# Patient Record
Sex: MALE | Race: WHITE | NOT HISPANIC OR LATINO | Employment: OTHER | URBAN - METROPOLITAN AREA
[De-identification: names, ages, dates, MRNs, and addresses within clinical notes are randomized per-mention and may not be internally consistent; named-entity substitution may affect disease eponyms.]

---

## 2021-01-16 ENCOUNTER — APPOINTMENT (OUTPATIENT)
Dept: CT IMAGING | Facility: HOSPITAL | Age: 36
End: 2021-01-16

## 2021-01-16 ENCOUNTER — HOSPITAL ENCOUNTER (EMERGENCY)
Facility: HOSPITAL | Age: 36
Discharge: HOME | End: 2021-01-16
Attending: EMERGENCY MEDICINE | Admitting: EMERGENCY MEDICINE

## 2021-01-16 VITALS
HEIGHT: 73 IN | WEIGHT: 201 LBS | TEMPERATURE: 97.2 F | SYSTOLIC BLOOD PRESSURE: 123 MMHG | OXYGEN SATURATION: 100 % | HEART RATE: 82 BPM | DIASTOLIC BLOOD PRESSURE: 84 MMHG | RESPIRATION RATE: 17 BRPM | BODY MASS INDEX: 26.64 KG/M2

## 2021-01-16 DIAGNOSIS — K40.90 LEFT INGUINAL HERNIA: Primary | ICD-10-CM

## 2021-01-16 LAB
ALBUMIN SERPL-MCNC: 4.9 G/DL (ref 3.5–5.2)
ALBUMIN/GLOB SERPL: 2.1 G/DL
ALP SERPL-CCNC: 57 U/L (ref 39–117)
ALT SERPL W P-5'-P-CCNC: 22 U/L (ref 1–41)
ANION GAP SERPL CALCULATED.3IONS-SCNC: 11.1 MMOL/L (ref 5–15)
AST SERPL-CCNC: 21 U/L (ref 1–40)
BASOPHILS # BLD AUTO: 0.03 10*3/MM3 (ref 0–0.2)
BASOPHILS NFR BLD AUTO: 0.4 % (ref 0–1.5)
BILIRUB SERPL-MCNC: 0.7 MG/DL (ref 0–1.2)
BILIRUB UR QL STRIP: NEGATIVE
BUN SERPL-MCNC: 10 MG/DL (ref 6–20)
BUN/CREAT SERPL: 9.5 (ref 7–25)
CALCIUM SPEC-SCNC: 9.1 MG/DL (ref 8.6–10.5)
CHLORIDE SERPL-SCNC: 101 MMOL/L (ref 98–107)
CLARITY UR: CLEAR
CO2 SERPL-SCNC: 25.9 MMOL/L (ref 22–29)
COLOR UR: YELLOW
CREAT SERPL-MCNC: 1.05 MG/DL (ref 0.76–1.27)
DEPRECATED RDW RBC AUTO: 44 FL (ref 37–54)
EOSINOPHIL # BLD AUTO: 0.03 10*3/MM3 (ref 0–0.4)
EOSINOPHIL NFR BLD AUTO: 0.4 % (ref 0.3–6.2)
ERYTHROCYTE [DISTWIDTH] IN BLOOD BY AUTOMATED COUNT: 14.2 % (ref 12.3–15.4)
GFR SERPL CREATININE-BSD FRML MDRD: 79 ML/MIN/1.73
GFR SERPL CREATININE-BSD FRML MDRD: 96 ML/MIN/1.73
GLOBULIN UR ELPH-MCNC: 2.3 GM/DL
GLUCOSE SERPL-MCNC: 103 MG/DL (ref 65–99)
GLUCOSE UR STRIP-MCNC: NEGATIVE MG/DL
HCT VFR BLD AUTO: 55.4 % (ref 37.5–51)
HGB BLD-MCNC: 18.4 G/DL (ref 13–17.7)
HGB UR QL STRIP.AUTO: NEGATIVE
IMM GRANULOCYTES # BLD AUTO: 0.03 10*3/MM3 (ref 0–0.05)
IMM GRANULOCYTES NFR BLD AUTO: 0.4 % (ref 0–0.5)
KETONES UR QL STRIP: NEGATIVE
LEUKOCYTE ESTERASE UR QL STRIP.AUTO: NEGATIVE
LIPASE SERPL-CCNC: 16 U/L (ref 13–60)
LYMPHOCYTES # BLD AUTO: 1.26 10*3/MM3 (ref 0.7–3.1)
LYMPHOCYTES NFR BLD AUTO: 17.9 % (ref 19.6–45.3)
MCH RBC QN AUTO: 29 PG (ref 26.6–33)
MCHC RBC AUTO-ENTMCNC: 33.2 G/DL (ref 31.5–35.7)
MCV RBC AUTO: 87.4 FL (ref 79–97)
MONOCYTES # BLD AUTO: 0.45 10*3/MM3 (ref 0.1–0.9)
MONOCYTES NFR BLD AUTO: 6.4 % (ref 5–12)
NEUTROPHILS NFR BLD AUTO: 5.23 10*3/MM3 (ref 1.7–7)
NEUTROPHILS NFR BLD AUTO: 74.5 % (ref 42.7–76)
NITRITE UR QL STRIP: NEGATIVE
NRBC BLD AUTO-RTO: 0 /100 WBC (ref 0–0.2)
PH UR STRIP.AUTO: 5.5 [PH] (ref 5–8)
PLATELET # BLD AUTO: 304 10*3/MM3 (ref 140–450)
PMV BLD AUTO: 9.1 FL (ref 6–12)
POTASSIUM SERPL-SCNC: 4 MMOL/L (ref 3.5–5.2)
PROT SERPL-MCNC: 7.2 G/DL (ref 6–8.5)
PROT UR QL STRIP: ABNORMAL
RBC # BLD AUTO: 6.34 10*6/MM3 (ref 4.14–5.8)
SODIUM SERPL-SCNC: 138 MMOL/L (ref 136–145)
SP GR UR STRIP: 1.02 (ref 1–1.03)
UROBILINOGEN UR QL STRIP: ABNORMAL
WBC # BLD AUTO: 7.03 10*3/MM3 (ref 3.4–10.8)

## 2021-01-16 PROCEDURE — 83690 ASSAY OF LIPASE: CPT | Performed by: NURSE PRACTITIONER

## 2021-01-16 PROCEDURE — 85025 COMPLETE CBC W/AUTO DIFF WBC: CPT | Performed by: NURSE PRACTITIONER

## 2021-01-16 PROCEDURE — 96374 THER/PROPH/DIAG INJ IV PUSH: CPT

## 2021-01-16 PROCEDURE — 99283 EMERGENCY DEPT VISIT LOW MDM: CPT

## 2021-01-16 PROCEDURE — 25010000002 ONDANSETRON PER 1 MG: Performed by: NURSE PRACTITIONER

## 2021-01-16 PROCEDURE — 25010000002 KETOROLAC TROMETHAMINE PER 15 MG: Performed by: NURSE PRACTITIONER

## 2021-01-16 PROCEDURE — 74176 CT ABD & PELVIS W/O CONTRAST: CPT

## 2021-01-16 PROCEDURE — 81003 URINALYSIS AUTO W/O SCOPE: CPT | Performed by: NURSE PRACTITIONER

## 2021-01-16 PROCEDURE — 96375 TX/PRO/DX INJ NEW DRUG ADDON: CPT

## 2021-01-16 PROCEDURE — 80053 COMPREHEN METABOLIC PANEL: CPT | Performed by: NURSE PRACTITIONER

## 2021-01-16 RX ORDER — SODIUM CHLORIDE 0.9 % (FLUSH) 0.9 %
10 SYRINGE (ML) INJECTION AS NEEDED
Status: DISCONTINUED | OUTPATIENT
Start: 2021-01-16 | End: 2021-01-16 | Stop reason: HOSPADM

## 2021-01-16 RX ORDER — TRAMADOL HYDROCHLORIDE 50 MG/1
50 TABLET ORAL EVERY 6 HOURS PRN
Qty: 20 TABLET | Refills: 0 | Status: SHIPPED | OUTPATIENT
Start: 2021-01-16 | End: 2021-01-26

## 2021-01-16 RX ORDER — ONDANSETRON 2 MG/ML
4 INJECTION INTRAMUSCULAR; INTRAVENOUS ONCE
Status: COMPLETED | OUTPATIENT
Start: 2021-01-16 | End: 2021-01-16

## 2021-01-16 RX ORDER — KETOROLAC TROMETHAMINE 15 MG/ML
15 INJECTION, SOLUTION INTRAMUSCULAR; INTRAVENOUS ONCE
Status: COMPLETED | OUTPATIENT
Start: 2021-01-16 | End: 2021-01-16

## 2021-01-16 RX ADMIN — ONDANSETRON 4 MG: 2 INJECTION INTRAMUSCULAR; INTRAVENOUS at 15:13

## 2021-01-16 RX ADMIN — SODIUM CHLORIDE 1000 ML: 9 INJECTION, SOLUTION INTRAVENOUS at 15:13

## 2021-01-16 RX ADMIN — KETOROLAC TROMETHAMINE 15 MG: 15 INJECTION, SOLUTION INTRAMUSCULAR; INTRAVENOUS at 15:14

## 2021-01-16 NOTE — ED PROVIDER NOTES
Pt presents to the ED c/o  left flank pain radiating into his left testicle for the past few days.  No issues with his urination.  Urinalysis and CT scan were negative for kidney stones.     On exam,   Awake, alert, no acute distress  Abdomen-soft, nondistended nontender, no CVA tenderness  -testicles descended bilaterally, there is a small left inguinal hernia that is tender.     Plan: Refer to general surgery for further evaluation.  Ultram prescribed.      Appropriate PPE worn by myself and the patient throughout our entire interaction.       Attestation:  The STEPHANIE and I have discussed this patient's history, physical exam, and treatment plan.  I have reviewed the documentation and personally had a face to face interaction with the patient. I affirm the documentation and agree with the treatment and plan.  The attached note describes my personal findings.            Talha Cross MD  01/16/21 0786

## 2021-01-16 NOTE — ED TRIAGE NOTES
Patient to er from home with c/o left flank pain and left lower ABD pain with nausea for the last three days. Patient has mask on in triage along with staff.

## 2021-01-16 NOTE — ED NOTES
"Pt c/o left lower abd pain radiating into left flank and left testicle. Describes pain as \"tearing\". Pt states pain began a few days ago, states someone told him to try and drink alcohol so he drank 6 beers last night and smoked some marijuana, states it did help for a little but then was hurting again this morning so he was worried. Pt wearing mask throughout encouter. This RN wearing mask and goggles throughout encounter.       Una Gooden, RN  01/16/21 1531    "

## 2021-01-16 NOTE — ED PROVIDER NOTES
EMERGENCY DEPARTMENT ENCOUNTER    Room Number:  09/09  Date seen:  1/16/2021  Time seen: 15:03 EST  PCP: Provider, No Known  Historian: Patient    HPI:  Chief complaint: Left back and abdominal pain  A complete HPI/ROS/PMH/PSH/SH/FH are unobtainable due to: Not applicable  Context:Steve Casas is a 35 y.o. male who presents to the ED with c/o 2 days of moderate left flank pain that radiates towards the left lower abdomen with associated testicular pain on the left.  It is not made better or worse by anything.  He did try to drink a beer last night because he heard that that helps kidney stones.  He has no history of kidney stones.  He has no history of abdominal surgeries.  He denies nausea states he did get a little bit hot and sweaty today but he has no other complaints.  He denies any shortness of breath, chest pain, fever or chills.  He has no urinary symptoms and denies any exposures to sexually transmitted diseases.    Patient was placed in face mask in first look. Patient was wearing facemask when I entered the room and throughout our encounter. I wore full protective equipment throughout this patient encounter including a face mask, eye shield and gloves. Hand hygiene/washing of hands was performed before donning protective equipment and after removal when leaving the room.    MEDICAL RECORD REVIEW    ALLERGIES  Patient has no known allergies.    PAST MEDICAL HISTORY  Active Ambulatory Problems     Diagnosis Date Noted   • No Active Ambulatory Problems     Resolved Ambulatory Problems     Diagnosis Date Noted   • No Resolved Ambulatory Problems     No Additional Past Medical History       PAST SURGICAL HISTORY  History reviewed. No pertinent surgical history.    FAMILY HISTORY  History reviewed. No pertinent family history.    SOCIAL HISTORY  Social History     Socioeconomic History   • Marital status:      Spouse name: Not on file   • Number of children: Not on file   • Years of education: Not on  file   • Highest education level: Not on file   Tobacco Use   • Smoking status: Never Smoker   • Smokeless tobacco: Never Used   Substance and Sexual Activity   • Alcohol use: Yes     Comment: social    • Drug use: Yes     Types: Marijuana     Comment: occasionally        REVIEW OF SYSTEMS  Review of Systems    All systems reviewed and negative except for those discussed in HPI.     PHYSICAL EXAM    ED Triage Vitals   Temp Heart Rate Resp BP SpO2   01/16/21 1430 01/16/21 1430 01/16/21 1454 01/16/21 1454 01/16/21 1430   97.2 °F (36.2 °C) 110 17 147/95 96 %      Temp src Heart Rate Source Patient Position BP Location FiO2 (%)   01/16/21 1430 -- -- -- --   Tympanic         Physical Exam    I have reviewed the triage vital signs and nursing notes.      GENERAL: distressed, appears uncomfortable  HENT: nares patent, mm moist  EYES: no scleral icterus  NECK: no ROM limitations  CV: regular rhythm, tachycardia, no murmur, no rubs, no gallups  RESPIRATORY: normal effort, CTAB  ABDOMEN: soft, left CVA tenderness, BS normal, no guarding, no rigidity  : deferred  MUSCULOSKELETAL: no deformity  NEURO: alert, moves all extremities, follows commands  SKIN: warm, mildly diaphoretic    LAB RESULTS  Recent Results (from the past 24 hour(s))   Comprehensive Metabolic Panel    Collection Time: 01/16/21  3:04 PM    Specimen: Blood   Result Value Ref Range    Glucose 103 (H) 65 - 99 mg/dL    BUN 10 6 - 20 mg/dL    Creatinine 1.05 0.76 - 1.27 mg/dL    Sodium 138 136 - 145 mmol/L    Potassium 4.0 3.5 - 5.2 mmol/L    Chloride 101 98 - 107 mmol/L    CO2 25.9 22.0 - 29.0 mmol/L    Calcium 9.1 8.6 - 10.5 mg/dL    Total Protein 7.2 6.0 - 8.5 g/dL    Albumin 4.90 3.50 - 5.20 g/dL    ALT (SGPT) 22 1 - 41 U/L    AST (SGOT) 21 1 - 40 U/L    Alkaline Phosphatase 57 39 - 117 U/L    Total Bilirubin 0.7 0.0 - 1.2 mg/dL    eGFR Non African Amer 79 >60 mL/min/1.73    eGFR  African Amer 96 >60 mL/min/1.73    Globulin 2.3 gm/dL    A/G Ratio 2.1 g/dL     BUN/Creatinine Ratio 9.5 7.0 - 25.0    Anion Gap 11.1 5.0 - 15.0 mmol/L   Lipase    Collection Time: 01/16/21  3:04 PM    Specimen: Blood   Result Value Ref Range    Lipase 16 13 - 60 U/L   Urinalysis With Culture If Indicated - Urine, Clean Catch    Collection Time: 01/16/21  3:04 PM    Specimen: Urine, Clean Catch   Result Value Ref Range    Color, UA Yellow Yellow, Straw    Appearance, UA Clear Clear    pH, UA 5.5 5.0 - 8.0    Specific Gravity, UA 1.016 1.005 - 1.030    Glucose, UA Negative Negative    Ketones, UA Negative Negative    Bilirubin, UA Negative Negative    Blood, UA Negative Negative    Protein, UA Trace (A) Negative    Leuk Esterase, UA Negative Negative    Nitrite, UA Negative Negative    Urobilinogen, UA 0.2 E.U./dL 0.2 - 1.0 E.U./dL   CBC Auto Differential    Collection Time: 01/16/21  3:04 PM    Specimen: Blood   Result Value Ref Range    WBC 7.03 3.40 - 10.80 10*3/mm3    RBC 6.34 (H) 4.14 - 5.80 10*6/mm3    Hemoglobin 18.4 (H) 13.0 - 17.7 g/dL    Hematocrit 55.4 (H) 37.5 - 51.0 %    MCV 87.4 79.0 - 97.0 fL    MCH 29.0 26.6 - 33.0 pg    MCHC 33.2 31.5 - 35.7 g/dL    RDW 14.2 12.3 - 15.4 %    RDW-SD 44.0 37.0 - 54.0 fl    MPV 9.1 6.0 - 12.0 fL    Platelets 304 140 - 450 10*3/mm3    Neutrophil % 74.5 42.7 - 76.0 %    Lymphocyte % 17.9 (L) 19.6 - 45.3 %    Monocyte % 6.4 5.0 - 12.0 %    Eosinophil % 0.4 0.3 - 6.2 %    Basophil % 0.4 0.0 - 1.5 %    Immature Grans % 0.4 0.0 - 0.5 %    Neutrophils, Absolute 5.23 1.70 - 7.00 10*3/mm3    Lymphocytes, Absolute 1.26 0.70 - 3.10 10*3/mm3    Monocytes, Absolute 0.45 0.10 - 0.90 10*3/mm3    Eosinophils, Absolute 0.03 0.00 - 0.40 10*3/mm3    Basophils, Absolute 0.03 0.00 - 0.20 10*3/mm3    Immature Grans, Absolute 0.03 0.00 - 0.05 10*3/mm3    nRBC 0.0 0.0 - 0.2 /100 WBC         RADIOLOGY RESULTS  CT Abdomen Pelvis Stone Protocol   Final Result           1. No urolithiasis or hydronephrosis.   2. Colonic diverticulosis. No acute inflammatory process of bowel  is   identified. Follow-up as indications persist.       This report was finalized on 1/16/2021 3:57 PM by Dr. Jarrett Hu M.D.                PROGRESS, DATA ANALYSIS, CONSULTS AND MEDICAL DECISION MAKING  All labs have been independently reviewed by me.  All radiology studies have been reviewed by me and discussed with radiologist dictating the report.  EKG's independently viewed and interpreted by me unless stated otherwise. Discussion below represents my analysis of pertinent findings related to patient's condition, differential diagnosis, treatment plan and final disposition.     ED Course as of Jan 16 1621   Sat Jan 16, 2021   1526 Patient states his pain is completely relieved after medications.  He is on his way to CT scan.    [EW]   1615 Discussed CT scan with patient.  Dr. Cross at bedside for male  exam.  He feels a very small left sided inguinal hernia.  We will give patient name of general surgeon to follow up with for hernia repair.      MDM:  CT not acute for stone, labs unremarkable, and pain is controlled in ED.  He does not appear toxic and has not had any n/v.     [EW]      ED Course User Index  [EW] McDonald Georgie Karina, NICOLE     DDX: acute UTI, renal stone, renal colic, hernia    MDM:  See above in ED course    Reviewed pt's history and workup with Dr. Cross.  After a bedside evaluation, Dr. Cross agrees with the plan of care.  The patient's history, physical exam, and lab findings were discussed with the physician, who also performed a face to face history and physical exam.  I discussed all results and noted any abnormalities with patient.  Discussed absoute need to recheck abnormalities with their family physician.  I answered any of the patient's questions.  Discussed plan for discharge, as there is no emergent indication for admission.  Pt is agreeable and understands need for follow up and repeat testing.  Pt is aware that discharge does not mean that nothing is wrong but it  "indicates no emergency is present and they must continue care with their family physician.  Pt is discharged with instructions to follow up with primary care doctor to have their blood pressure rechecked.         Disposition vitals:  /95   Pulse 110   Temp 97.2 °F (36.2 °C) (Tympanic)   Resp 17   Ht 185.4 cm (73\")   Wt 91.2 kg (201 lb)   SpO2 96%   BMI 26.52 kg/m²       DIAGNOSIS  Final diagnoses:   Left inguinal hernia       FOLLOW UP   Jim Moore MD  [address] [id number] [phone]    Schedule an appointment as soon as possible for a visit            Georgie Ryan, APRN  01/16/21 7793    "

## 2021-01-26 ENCOUNTER — OFFICE VISIT (OUTPATIENT)
Dept: SURGERY | Facility: CLINIC | Age: 36
End: 2021-01-26

## 2021-01-26 VITALS — WEIGHT: 200.6 LBS | HEIGHT: 73 IN | BODY MASS INDEX: 26.59 KG/M2

## 2021-01-26 DIAGNOSIS — T14.8XXA MUSCLE STRAIN: Primary | ICD-10-CM

## 2021-01-26 PROCEDURE — 99203 OFFICE O/P NEW LOW 30 MIN: CPT | Performed by: SURGERY

## 2021-01-26 RX ORDER — MELOXICAM 7.5 MG/1
7.5 TABLET ORAL DAILY
Status: SHIPPED | OUTPATIENT
Start: 2021-01-26 | End: 2021-02-09

## 2021-01-27 NOTE — PROGRESS NOTES
"CC: Left groin pain     HPI: Patient is a very pleasant 35-year-old male that was self-referred for evaluation of left groin pain.  The patient reports starting having left groin pain last week while exercising.  Pain happened after he twisted his waist.  It felt like a sharp pain over the left groin that radiated to the upper thigh and gluteal area.  The pain lasted for several minutes and improved alone.  The pain only happen when twisting his waist, otherwise he denies any pain while walking, straining, lifting.  He has not felt any bulging over the groin area.  Pain does not radiate anywhere.  Pain has significantly improved since this first happened a week ago.  Denies any obstructive symptoms     PMH: None     PSH: None    MEDS: Tramadol as needed     ALL: No known drug allergies    FH and SH: Family history is noncontributory.  Denies smoking, he drinks alcohol occasionally and does not take any drugs     ROS:   Constitutional: denies any weight changes, fatigue or weakness.  HEENT: Denies hearing loss and rhinorrhea  Cardiovascular: denies chest pain, palpitations, edemas.  Respiratory: denies cough, sputum, SOB.  Gastrointestinal: denies N&V, abd pain, diarrhea, constipation.  Genitourinary: denies dysuria, frequency.  Endocrine: denies cold intolerance, lethargy and flushing.  Hem: denies excessive bruising and postop bleeding.  Musculoskeletal: denies weakness, joint swelling, pain or stiffness.  Neuro: denies seizures, CVA, paresthesia, or peripheral neuropathy.   Skin: denies change in nevi, rashes, masses.     PE:   Vitals:    01/26/21 1459   Weight: 91 kg (200 lb 9.6 oz)   Height: 185.4 cm (73\")     Body mass index is 26.47 kg/m².   Alert and oriented ×3, no acute distress.  Head is normocephalic and atraumatic.  Neck is supple there is no thyromegaly or lymphadenopathy  Chest is clear bilaterally there is no added sounds  Regular rate and rhythm, no murmurs  Abdomen is soft and nontender, is " nondistended, bowel sounds are positive.  There is no evidence of inguinal hernia.  Left gluteal area is not tenderness.  There is no masses  No clubbing cyanosis or edema in lower or upper extremities    Diagnostic studies:   CT scan of the abdomen pelvis done on 1/16/2021 show colonic diverticulosis without inflammation.  There is no evidence of inguinal hernia, small  fat-containing umbilical hernia    Assessment and plan    The patient is a very pleasant 35-year-old male with likely muscle strain.  I do not feel an inguinal hernia his pain has been getting better.  I recommend that he start taking meloxicam 7.5 mg daily for 14 days.  He should avoid heavy exercise for at least 4 to 5 days.  Asymptomatic umbilical hernia, no need to have it repaired.  He will follow-up in my office in 2 weeks if pain persists.  He understood     Jim Moore MD  General, Minimally Invasive and Endoscopic Surgery  [facility]     [address], [address]  [place], [place], [address]  P: [phone]  F: [phone]